# Patient Record
Sex: MALE | Race: BLACK OR AFRICAN AMERICAN | URBAN - METROPOLITAN AREA
[De-identification: names, ages, dates, MRNs, and addresses within clinical notes are randomized per-mention and may not be internally consistent; named-entity substitution may affect disease eponyms.]

---

## 2021-08-27 ENCOUNTER — OFFICE VISIT (OUTPATIENT)
Dept: FAMILY MEDICINE CLINIC | Age: 26
End: 2021-08-27

## 2021-08-27 VITALS
SYSTOLIC BLOOD PRESSURE: 126 MMHG | HEIGHT: 70 IN | RESPIRATION RATE: 16 BRPM | BODY MASS INDEX: 30.75 KG/M2 | WEIGHT: 214.8 LBS | TEMPERATURE: 98.4 F | DIASTOLIC BLOOD PRESSURE: 80 MMHG | HEART RATE: 82 BPM | OXYGEN SATURATION: 97 %

## 2021-08-27 DIAGNOSIS — Z11.3 SCREENING EXAMINATION FOR STD (SEXUALLY TRANSMITTED DISEASE): ICD-10-CM

## 2021-08-27 DIAGNOSIS — Z00.00 ANNUAL PHYSICAL EXAM: Primary | ICD-10-CM

## 2021-08-27 DIAGNOSIS — A60.00 RECURRENT GENITAL HERPES: ICD-10-CM

## 2021-08-27 DIAGNOSIS — Z79.899 MEDICATION MANAGEMENT: ICD-10-CM

## 2021-08-27 LAB
ANION GAP SERPL CALC-SCNC: 6 MMOL/L (ref 5–15)
BUN SERPL-MCNC: 11 MG/DL (ref 6–20)
BUN/CREAT SERPL: 10 (ref 12–20)
CALCIUM SERPL-MCNC: 9.6 MG/DL (ref 8.5–10.1)
CHLORIDE SERPL-SCNC: 107 MMOL/L (ref 97–108)
CO2 SERPL-SCNC: 28 MMOL/L (ref 21–32)
CREAT SERPL-MCNC: 1.15 MG/DL (ref 0.7–1.3)
GLUCOSE SERPL-MCNC: 89 MG/DL (ref 65–100)
HBV SURFACE AG SER QL: <0.1 INDEX
HBV SURFACE AG SER QL: NEGATIVE
HCV AB SERPL QL IA: NONREACTIVE
HIV 1+2 AB+HIV1 P24 AG SERPL QL IA: NONREACTIVE
HIV12 RESULT COMMENT, HHIVC: NORMAL
POTASSIUM SERPL-SCNC: 4.8 MMOL/L (ref 3.5–5.1)
RPR SER QL: NONREACTIVE
SODIUM SERPL-SCNC: 141 MMOL/L (ref 136–145)

## 2021-08-27 PROCEDURE — 99385 PREV VISIT NEW AGE 18-39: CPT | Performed by: STUDENT IN AN ORGANIZED HEALTH CARE EDUCATION/TRAINING PROGRAM

## 2021-08-27 RX ORDER — VALACYCLOVIR HYDROCHLORIDE 1 G/1
1000 TABLET, FILM COATED ORAL DAILY
Qty: 90 TABLET | Refills: 3 | Status: SHIPPED | OUTPATIENT
Start: 2021-08-27

## 2021-08-27 RX ORDER — BISMUTH SUBSALICYLATE 262 MG
1 TABLET,CHEWABLE ORAL DAILY
COMMUNITY

## 2021-08-27 NOTE — PROGRESS NOTES
[address] [address] FIDE Ruiz. [facility], [address]  [phone]    C/C: Complete physical    Subjective:    Lawanda Rico is a 27 y.o. male who presents to clinic today for annual physical exam.      Acute/chronic Concerns include:     Genital Herpes, recurrent:    Pt with history of genital herpes with frequent outbreaks. Last outbreak was about a month ago. STD screening   Pt presents for evaluation of possible STD infection. Pt reports having a partner with whom they have been together for about 3 years but would like to be screened for STD. General Health Habits:  - drinks wine occasionally  - No cigarette  - Just bought a house with his Girlfriend. Gf has a kid. Health Maintenance reviewed -     Health Maintenance Due   Topic Date Due    Hepatitis C Screening  Never done    COVID-19 Vaccine (1) Never done    DTaP/Tdap/Td series (1 - Tdap) Never done       Depression screening:       3 most recent PHQ Screens 8/27/2021   Little interest or pleasure in doing things Not at all   Feeling down, depressed, irritable, or hopeless Not at all   Total Score PHQ 2 0       Review of Systems   A comprehensive review of systems was negative except for that written in the History of Present Illness. Allergies- reviewed: Allergies   Allergen Reactions    Penicillins Swelling       Medications- reviewed:   Current Outpatient Medications   Medication Sig    multivitamin (ONE A DAY) tablet Take 1 Tablet by mouth daily.  valACYclovir (VALTREX) 1 gram tablet Take 1 Tablet by mouth daily. No current facility-administered medications for this visit. Past Medical History- reviewed:  Past Medical History:   Diagnosis Date    Herpes genitalis        Past Surgical History- reviewed:   History reviewed. No pertinent surgical history.     Family History - reviewed:  Family History   Problem Relation Age of Onset    Hypertension Mother     Heart Disease Mother     Diabetes Mother     No Known Problems Father        Social History - reviewed:  Social History     Socioeconomic History    Marital status: SINGLE     Spouse name: Not on file    Number of children: Not on file    Years of education: Not on file    Highest education level: Not on file   Occupational History    Not on file   Tobacco Use    Smoking status: Never Smoker    Smokeless tobacco: Never Used   Vaping Use    Vaping Use: Never used   Substance and Sexual Activity    Alcohol use: Yes     Comment: rarely     Drug use: Never    Sexual activity: Yes     Partners: Female   Other Topics Concern    Not on file   Social History Narrative    Not on file     Social Determinants of Health     Financial Resource Strain:     Difficulty of Paying Living Expenses:    Food Insecurity:     Worried About Running Out of Food in the Last Year:     [address] in the Last Year:    Transportation Needs:     Lack of Transportation (Medical):      Lack of Transportation (Non-Medical):    Physical Activity:     Days of Exercise per Week:     Minutes of Exercise per Session:    Stress:     Feeling of Stress :    Social Connections:     Frequency of Communication with Friends and Family:     Frequency of Social Gatherings with Friends and Family:     Attends Yazdan[employer] Services:     Active Member of Clubs or Organizations:     Attends Club or Organization Meetings:     Marital Status:    Intimate Partner Violence:     Fear of Current or Ex-Partner:     Emotionally Abused:     Physically Abused:     Sexually Abused:          Objective:     Visit Vitals  /80   Pulse 82   Temp 98.4 °F (36.9 °C) (Oral)   Resp 16   Ht 5' 9.5\" (1.765 m)   Wt 214 lb 12.8 oz (97.4 kg)   SpO2 97%   BMI 31.27 kg/m²       General appearance: alert, cooperative, no distress, appears stated age  Head: Normocephalic, without obvious abnormality, atraumatic, sinuses nontender to percussion  Eyes: conjunctivae/corneas clear. PERRL, EOM's intact. Ears: normal TM's and external ear canals AU  Nose: Nares normal. Septum midline. Mucosa normal. No drainage or sinus tenderness. Throat: Lips, mucosa, and tongue normal. Teeth and gums normal  Neck: supple, symmetrical, trachea midline, no adenopathy. Back: symmetric, no curvature. ROM normal. No CVA tenderness. Lungs: clear to auscultation bilaterally, no wheezes, no increased work of breathing  Heart: regular rate and rhythm, S1, S2 normal, no murmur, click, rub or gallop  Abdomen: soft, non-tender. Bowel sounds normal. No masses,  no organomegaly  Extremities: extremities normal, atraumatic, no cyanosis or edema  Skin: Skin color, texture, turgor normal. No rashes or lesions  Neurologic: Alert and oriented X 3, normal strength and tone. Normal symmetric reflexes. Normal coordination and gait   - No penile lesions or discharge, no testicular masses or tenderness, no hernias       Assessment/Plan       ICD-10-CM ICD-9-CM    1. Annual physical exam  Z00.00 V70.0    2. Recurrent genital herpes  A60.00 054.10 valACYclovir (VALTREX) 1 gram tablet   3. Screening examination for STD (sexually transmitted disease)  Z11.3 V74.5 HIV 1/2 AG/AB, 4TH GENERATION,W RFLX CONFIRM      RPR      HEP B SURFACE AG      HEPATITIS C AB      HEPATITIS C AB      HEP B SURFACE AG      RPR      HIV 1/2 AG/AB, 4TH GENERATION,W RFLX CONFIRM   4. Medication management  C86.113 X48.23 METABOLIC PANEL, BASIC      METABOLIC PANEL, BASIC       1. Annual physical exam    I have discussed with pt the following topics:   - Discussed with patient the cancer risk factors and recommendations  - Reviewed diet, exercise and weight control  -Immunizations appropriate for age were discussed with patient and updated   - Recommended sodium restriction  - Reviewed medications and side effects in detail    2. Recurrent genital herpes  - valACYclovir (VALTREX) 1 gram tablet; Take 1 Tablet by mouth daily.       3. Screening examination for STD (sexually transmitted disease)  - HIV 1/2 AG/AB, 4TH GENERATION,W RFLX CONFIRM; Future  - RPR; Future  - HEP B SURFACE AG; Future  - HEPATITIS C AB; Future    4. Medication management  - METABOLIC PANEL, BASIC; Future    Follow up: 1 year. RTC if needed    We discussed the expected course, resolution and complications of the diagnosis(es) in detail. Medication risks, benefits, costs, interactions, and alternatives were discussed as indicated. I advised to contact the office if his condition worsens, changes or fails to improve as anticipated. Pt expressed understanding with the diagnosis(es) and plan. Patient understands that this encounter was a temporary measure, and the importance of further follow up and examination was emphasized. Patient verbalized understanding.       Signed By: Min Guerra MD     August 27, 2021

## 2021-08-27 NOTE — PROGRESS NOTES
Chief Complaint   Patient presents with   [facility] Establish Care     new patient      Pt here today to establish care. Main concern today is treatment for general herpes. Pt has not seen a PCP as an adult, been to urgent cares as needed.

## 2021-08-30 ENCOUNTER — TELEPHONE (OUTPATIENT)
Dept: FAMILY MEDICINE CLINIC | Age: 26
End: 2021-08-30

## 2021-08-30 NOTE — TELEPHONE ENCOUNTER
----- Message from [facility] sent at 8/30/2021  2:27 PM EDT -----  Regarding: Dr. Abdi Crowell Message/Vendor Calls    Caller's first and last name: Erasmo Ramosmaximilian       Reason for call: 1 Antelmo Barton required yes/no and why: Y       Best contact number(s): [phone]      Details to clarify the request:  Would like his pharmacy [facility]